# Patient Record
Sex: FEMALE | Race: WHITE | Employment: OTHER | URBAN - METROPOLITAN AREA
[De-identification: names, ages, dates, MRNs, and addresses within clinical notes are randomized per-mention and may not be internally consistent; named-entity substitution may affect disease eponyms.]

---

## 2017-02-24 PROCEDURE — 81001 URINALYSIS AUTO W/SCOPE: CPT | Performed by: INTERNAL MEDICINE

## 2017-02-24 PROCEDURE — 87086 URINE CULTURE/COLONY COUNT: CPT | Performed by: INTERNAL MEDICINE

## 2017-03-24 PROCEDURE — 81001 URINALYSIS AUTO W/SCOPE: CPT | Performed by: INTERNAL MEDICINE

## 2017-03-24 PROCEDURE — 87086 URINE CULTURE/COLONY COUNT: CPT | Performed by: INTERNAL MEDICINE

## 2017-09-07 PROBLEM — I83.10 LIPODERMATOSCLEROSIS: Status: ACTIVE | Noted: 2017-09-07

## 2017-09-07 PROBLEM — M79.3 LIPODERMATOSCLEROSIS: Status: ACTIVE | Noted: 2017-09-07

## 2018-06-13 PROBLEM — I10 ESSENTIAL HYPERTENSION, BENIGN: Status: ACTIVE | Noted: 2018-06-13

## 2018-11-03 PROCEDURE — 87086 URINE CULTURE/COLONY COUNT: CPT | Performed by: INTERNAL MEDICINE

## 2020-06-29 PROBLEM — M81.0 AGE-RELATED OSTEOPOROSIS WITHOUT CURRENT PATHOLOGICAL FRACTURE: Status: ACTIVE | Noted: 2020-06-29

## 2021-08-02 PROBLEM — E03.8 SUBCLINICAL HYPOTHYROIDISM: Status: ACTIVE | Noted: 2021-08-02

## 2022-06-03 ENCOUNTER — ANESTHESIA (OUTPATIENT)
Dept: ENDOSCOPY | Facility: HOSPITAL | Age: 82
End: 2022-06-03
Payer: MEDICARE

## 2022-06-03 ENCOUNTER — HOSPITAL ENCOUNTER (OUTPATIENT)
Facility: HOSPITAL | Age: 82
Setting detail: HOSPITAL OUTPATIENT SURGERY
Discharge: HOME | End: 2022-06-03
Attending: INTERNAL MEDICINE | Admitting: INTERNAL MEDICINE
Payer: MEDICARE

## 2022-06-03 ENCOUNTER — ANESTHESIA EVENT (OUTPATIENT)
Dept: ENDOSCOPY | Facility: HOSPITAL | Age: 82
End: 2022-06-03
Payer: MEDICARE

## 2022-06-03 VITALS
WEIGHT: 124 LBS | SYSTOLIC BLOOD PRESSURE: 123 MMHG | HEART RATE: 87 BPM | RESPIRATION RATE: 18 BRPM | BODY MASS INDEX: 22.82 KG/M2 | HEIGHT: 62 IN | OXYGEN SATURATION: 97 % | DIASTOLIC BLOOD PRESSURE: 64 MMHG | TEMPERATURE: 98 F

## 2022-06-03 PROCEDURE — 0DBK8ZX EXCISION OF ASCENDING COLON, VIA NATURAL OR ARTIFICIAL OPENING ENDOSCOPIC, DIAGNOSTIC: ICD-10-PCS | Performed by: INTERNAL MEDICINE

## 2022-06-03 PROCEDURE — 88305 TISSUE EXAM BY PATHOLOGIST: CPT | Performed by: INTERNAL MEDICINE

## 2022-06-03 RX ORDER — SODIUM CHLORIDE, SODIUM LACTATE, POTASSIUM CHLORIDE, CALCIUM CHLORIDE 600; 310; 30; 20 MG/100ML; MG/100ML; MG/100ML; MG/100ML
INJECTION, SOLUTION INTRAVENOUS CONTINUOUS
OUTPATIENT
Start: 2022-06-03

## 2022-06-03 RX ORDER — LIDOCAINE HYDROCHLORIDE 10 MG/ML
INJECTION, SOLUTION EPIDURAL; INFILTRATION; INTRACAUDAL; PERINEURAL AS NEEDED
Status: DISCONTINUED | OUTPATIENT
Start: 2022-06-03 | End: 2022-06-03 | Stop reason: SURG

## 2022-06-03 RX ORDER — SODIUM CHLORIDE, SODIUM LACTATE, POTASSIUM CHLORIDE, CALCIUM CHLORIDE 600; 310; 30; 20 MG/100ML; MG/100ML; MG/100ML; MG/100ML
INJECTION, SOLUTION INTRAVENOUS CONTINUOUS
Status: DISCONTINUED | OUTPATIENT
Start: 2022-06-03 | End: 2022-06-03

## 2022-06-03 RX ORDER — NALOXONE HYDROCHLORIDE 0.4 MG/ML
80 INJECTION, SOLUTION INTRAMUSCULAR; INTRAVENOUS; SUBCUTANEOUS AS NEEDED
OUTPATIENT
Start: 2022-06-03 | End: 2022-06-03

## 2022-06-03 RX ADMIN — LIDOCAINE HYDROCHLORIDE 50 MG: 10 INJECTION, SOLUTION EPIDURAL; INFILTRATION; INTRACAUDAL; PERINEURAL at 12:57:00

## 2022-06-03 NOTE — OPERATIVE REPORT
Colonoscopy Operative Report    Ramses Martinez Patient Status:  [facility] Outpatient Surgery    1940 MRN [id number]   Location [address] [address] Attending Benji Beckett MD   Hosp Day #   0 PCP Kaia Perez MD     Pre-Operative Diagnosis: POSITIVE COLRECTAL CNACER SCREENNG, COLOGUARD    Post-Operative Diagnosis:  Pan diverticulosis  Two 5 mm ascending colon polyps resected with cold snare    Procedure Performed: Procedure(s):  COLONOSCOPY with cold snare polypectomy x2     Informed Consent: Informed consent for both the procedure and sedation were obtained from the patient. The potentially life-threatening complications of sedation, bleeding,  perforation, transfusion or repeat endoscopy  were reviewed along with the possible need for hospitalization, surgical management, transfusion or repeat endoscopy should one of these complications arise. The patient understands and is agreeable to proceed. Sedation Type: MAC-Patient received sedation with monitored anesthesia provided by an anesthesiologist      Cecum Withdrawal Time:  12 minutes      Procedure Description: The patient was placed in the left lateral decubitus position. After careful digital rectal examination, the Pediatric colonoscope was inserted into the rectum and advanced to the level of the cecum under direct visualization. The cecum was identified by landmarks, including the appendiceal orifice and ileoceccal valve. Careful examination of the entire colon was performed during withdrawal of the endoscope. The scope was withdrawn to the rectum and retroflexion was performed. The patient tolerated the procedure well with no immediate complications. The patient was transferred to the recovery area in stable condition.   Quality of Preparation: Adequate  Aronchick Bowel Prep Scale:  good  Findings: Pan diverticulosis  Two 5 mm ascending colon polyps resected with cold snare    Recommendations: await pathology  Discharge: The patient was given an after visit summary detailing the procedure, findings, recommendations and follow up plans.      Aleshia Saab MD  6/3/2022  1:19 PM

## 2022-06-03 NOTE — ANESTHESIA POSTPROCEDURE EVALUATION
Seferino Staton 54 Patient Status:  Hospital Outpatient Surgery   Age/Gender 78year old female MRN [id number]   Location [address] [facility] Attending Torrie Benoit MD   [facility] Day # 0 PCP Robert Goldberg MD       Anesthesia Post-op Note    COLONOSCOPY with cold snare polypectomy x2    Procedure Summary     Date: 06/03/22 Room / Location: [facility] ENDOSCOPY 02 / [facility] ENDOSCOPY    Anesthesia Start: 7628 Anesthesia Stop: 5190    Procedure: COLONOSCOPY with cold snare polypectomy x2 (N/A ) Diagnosis: (Polyps, diveticulosis)    Surgeons: Torrie Benoit MD Anesthesiologist: Parul Olivas MD    Anesthesia Type: MAC ASA Status: 2          Anesthesia Type: MAC    Vitals Value Taken Time   /62 06/03/22 1328   Temp    06/03/22 1333   Pulse 80 06/03/22 1333   Resp 16 06/03/22 1333   SpO2 96 % 06/03/22 1333   Vitals shown include unvalidated device data.     Patient Location: Endoscopy    Anesthesia Type: MAC    Airway Patency: patent    Postop Pain Control: adequate    Mental Status: preanesthetic baseline    Nausea/Vomiting: none    Cardiopulmonary/Hydration status: stable euvolemic    Complications: no apparent anesthesia related complications    Postop vital signs: stable    Dental Exam: Unchanged from Preop

## 2024-05-21 ENCOUNTER — OFFICE VISIT (OUTPATIENT)
Dept: PODIATRY CLINIC | Facility: CLINIC | Age: 84
End: 2024-05-21

## 2024-05-21 DIAGNOSIS — M20.41 HAMMER TOES OF BOTH FEET: Primary | ICD-10-CM

## 2024-05-21 DIAGNOSIS — M79.675 TOE PAIN, BILATERAL: ICD-10-CM

## 2024-05-21 DIAGNOSIS — M20.42 HAMMER TOES OF BOTH FEET: Primary | ICD-10-CM

## 2024-05-21 DIAGNOSIS — M79.674 TOE PAIN, BILATERAL: ICD-10-CM

## 2024-05-21 DIAGNOSIS — L84 FOOT CALLUS: ICD-10-CM

## 2024-05-21 DIAGNOSIS — B35.1 ONYCHOMYCOSIS: ICD-10-CM

## 2024-05-21 DIAGNOSIS — M15.9 OSTEOARTHRITIS OF MULTIPLE JOINTS, UNSPECIFIED OSTEOARTHRITIS TYPE: ICD-10-CM

## 2024-05-21 PROCEDURE — 99203 OFFICE O/P NEW LOW 30 MIN: CPT | Performed by: STUDENT IN AN ORGANIZED HEALTH CARE EDUCATION/TRAINING PROGRAM

## 2024-05-24 NOTE — PROGRESS NOTES
[facility] Podiatry  Progress Note    Dee Moreno is a 84 year old female.   Chief Complaint   Patient presents with    Consult     Right foot 2nd toe wart- patient states toe is sore pain 5/10         HPI:     Patient is a pleasant 84-year-old female who presents to clinic for evaluation of lesion to her right second toe.  She does have some soreness to site that she rates at a 5 out of 10.  She denies any recent trauma or injury.  She is wondering what treatment options are available.  She does also have elongated and thickened nails she sometimes has difficulty trimming on her own.  No other complaints are mentioned.  Past medical history, medications, and allergies reviewed.      Allergies: Codeine phosphate, Azithromycin, Bactrim [sulfamethoxazole w/trimethoprim], Clindamycin, Penicillins, and Sulfa antibiotics   Current Outpatient Medications   Medication Sig Dispense Refill    traMADol 50 MG Oral Tab Take 1 tablet (50 mg total) by mouth every 6 (six) hours as needed for Pain. 270 tablet 2    levothyroxine 25 MCG Oral Tab Take 1 tablet (25 mcg total) by mouth before breakfast. 90 tablet 1    AMLODIPINE 5 MG Oral Tab TAKE 1 TABLET(5 MG) BY MOUTH DAILY 90 tablet 3    VYZULTA 0.024 % Ophthalmic Solution Place 1 drop into both eyes nightly. TAKE AT BEDTIME      famoTIDine 20 MG Oral Tab Take 1 tablet (20 mg total) by mouth nightly as needed for Heartburn. 90 tablet 3    lisinopril 40 MG Oral Tab TAKE 1 TABLET(40 MG) BY MOUTH DAILY 90 tablet 3    Meclizine HCl 25 MG Oral Tab Take 1 tablet (25 mg total) by mouth 3 (three) times daily as needed. 30 tablet 1    Denosumab 60 MG/ML Subcutaneous Solution Prefilled Syringe Inject 1 mL (60 mg total) into the skin every 6 (six) months.      Calcium Carb-Cholecalciferol (CALCIUM-VITAMIN D) 500-200 MG-UNIT Oral Tab Take 2 tablets by mouth.      hydrochlorothiazide 25 MG Oral Tab Take daily as needed 90 tablet 3    Estradiol (ESTRACE) 0.1 MG/GM Vaginal Cream Place 1 g  vaginally 3 (three) times a week. 42.5 g 11    aspirin 81 MG Oral Chew Tab Chew 1 tablet (81 mg total) by mouth daily.      vitamin E 1000 UNITS Oral Cap Take 1 capsule (1,000 Units total) by mouth 2 (two) times daily.      dorzolamide-timolol 22.3-6.8 MG/ML Ophthalmic Solution 1 drop 2 (two) times daily.      JUICE PLUS FIBRE OR Take 2 tablets by mouth 2 (two) times daily.      OMEGA 3 OR 1 TABLET DAILY      VITAMIN D 1000 UNIT OR TABS Take 2 tablets by mouth 2 (two) times daily.      OSCAL 500/200 D-3 500-200 MG-UNIT OR TABS Take 2 tablets by mouth 2 (two) times daily.      TYLENOL ARTHRITIS PAIN 650 MG OR TBCR Take 2 tablets (1,300 mg total) by mouth 2 (two) times daily.        Past Medical History:    Cataract    Disorder of thyroid    Esophageal reflux    Glaucoma    High blood pressure    High cholesterol    OSTEOARTHRITIS    Osteoarthritis of multiple joints, unspecified osteoarthritis type    OSTEOPOROSIS    PONV (postoperative nausea and vomiting)    ponv; bowel went into spasm per patient, use smaller scope      Past Surgical History:   Procedure Laterality Date    Appendectomy      Cataract      Colonoscopy N/A 6/3/2022    Procedure: COLONOSCOPY with cold snare polypectomy x2;  Surgeon: Rob Portillo MD;  Location:  ENDOSCOPY    Hysterectomy  8/10/16    vaginal hysterectomy and bladder sling with BSO    Mammogram, diagnostic, unilateral, right (cpt=77055)  9/09    [facility]    Other surgical history      multiple surgeries from fractures from falls    Other surgical history      bladder sling      Family History   Problem Relation Age of Onset    Breast Cancer Maternal Aunt 50        age 50's    Breast Cancer Sister 75        age 70's    Other (brain aneurysm) Father       Social History     Socioeconomic History    Marital status: Single   Occupational History    Occupation: Nun; [email or web address]   Tobacco Use    Smoking status: Never    Smokeless tobacco: Never   Vaping Use    Vaping status:  Never Used   Substance and Sexual Activity    Alcohol use: No     Alcohol/week: 0.0 standard drinks of alcohol    Drug use: No    Sexual activity: Never   Other Topics Concern    Exercise Yes     Comment: walk           REVIEW OF SYSTEMS:     Today reviewed systems as documented below  GENERAL HEALTH: feels well otherwise  SKIN: denies any unusual skin lesions or rashes  RESPIRATORY: denies shortness of breath with exertion  CARDIOVASCULAR: denies chest pain on exertion  GI: denies abdominal pain and denies heartburn  NEURO: denies headaches  MUSCULO: History of arthritis      EXAM:   LMP  (LMP Unknown)   GENERAL: well developed, well nourished, in no apparent distress  EXTREMITIES:   1. Integument: Normal skin temperature and turgor.  HPK noted to distal aspect of right second toe.  Nails x 10 are elongated and thickened.  2. Vascular: Pedal pulses are palpable.   3. Musculoskeletal: All muscle groups are graded 5 out of 5 in the foot and ankle.  Flexion contracture of lesser digits.  Pain noted to callus site.   4. Neurological: Normal sharp dull sensation; reflexes normal.        ASSESSMENT AND PLAN:   Diagnoses and all orders for this visit:    Hammer toes of both feet    Foot callus    Osteoarthritis of multiple joints, unspecified osteoarthritis type    Onychomycosis    Toe pain, bilateral        Plan:    -Patient examined, chart history reviewed.  -Discussed importance of proper pedal hygiene, regular foot checks.  -Pared HPK x 1 to healthy tissue without incident.  Can use urea cream to callus sites between visits.  -Sharply debrided nails x10 with a sterile nail nipper achieving a 20% reduction in thickness and length, without incident. Nails further smoothed with dremel.  -Ambulate with supportive shoes and inserts and avoid walking barefoot.  -Educated patient on acute signs of infection advised patient to seek immediate medical attention if symptoms arise.     RTC 2 to 3 months routine footcare as  needed.    RAQUEL Merlos speech recognition software was used to prepare this note.  Errors in word recognition may occur.  Please contact me with any questions/concerns with this note.

## 2024-07-02 ENCOUNTER — OFFICE VISIT (OUTPATIENT)
Dept: PODIATRY CLINIC | Facility: CLINIC | Age: 84
End: 2024-07-02

## 2024-07-02 DIAGNOSIS — L84 HELOMA DURUM: ICD-10-CM

## 2024-07-02 DIAGNOSIS — M20.42 HAMMER TOES OF BOTH FEET: Primary | ICD-10-CM

## 2024-07-02 DIAGNOSIS — M20.41 HAMMER TOES OF BOTH FEET: Primary | ICD-10-CM

## 2024-07-02 DIAGNOSIS — M20.41 HAMMER TOE OF RIGHT FOOT: ICD-10-CM

## 2024-07-02 PROCEDURE — 99213 OFFICE O/P EST LOW 20 MIN: CPT | Performed by: PODIATRIST

## 2024-07-02 NOTE — PROGRESS NOTES
Dee Moreno is a 84 year old female.   Chief Complaint   Patient presents with    Corn     Right 2nd toe- dr block shaved the corn down- but it keeps coming back- patient states that she tried to remove it- rates pain 8/10- burning pain goes underneath the right 2nd toe.          HPI:   Returns to the clinic complaining of her fourth toe on the right foot she develops a lot of pain when she tries to walk on it is at the very distal tip and pain comes down the side.  Pain can be as bad as 8 out of 10 burning pain.  At today's visit reviewed nurse's history as taken above, allergies medications and medical history as documented below.  All changes duly noted  Allergies: Codeine phosphate, Azithromycin, Bactrim [sulfamethoxazole w/trimethoprim], Clindamycin, Penicillins, and Sulfa antibiotics   Current Outpatient Medications   Medication Sig Dispense Refill    traMADol 50 MG Oral Tab Take 1 tablet (50 mg total) by mouth every 6 (six) hours as needed for Pain. 270 tablet 2    levothyroxine 25 MCG Oral Tab Take 1 tablet (25 mcg total) by mouth before breakfast. 90 tablet 1    AMLODIPINE 5 MG Oral Tab TAKE 1 TABLET(5 MG) BY MOUTH DAILY 90 tablet 3    VYZULTA 0.024 % Ophthalmic Solution Place 1 drop into both eyes nightly. TAKE AT BEDTIME      famoTIDine 20 MG Oral Tab Take 1 tablet (20 mg total) by mouth nightly as needed for Heartburn. 90 tablet 3    lisinopril 40 MG Oral Tab TAKE 1 TABLET(40 MG) BY MOUTH DAILY 90 tablet 3    Meclizine HCl 25 MG Oral Tab Take 1 tablet (25 mg total) by mouth 3 (three) times daily as needed. 30 tablet 1    Denosumab 60 MG/ML Subcutaneous Solution Prefilled Syringe Inject 1 mL (60 mg total) into the skin every 6 (six) months.      Calcium Carb-Cholecalciferol (CALCIUM-VITAMIN D) 500-200 MG-UNIT Oral Tab Take 2 tablets by mouth.      hydrochlorothiazide 25 MG Oral Tab Take daily as needed 90 tablet 3    Estradiol (ESTRACE) 0.1 MG/GM Vaginal Cream Place 1 g vaginally 3 (three) times a  week. 42.5 g 11    aspirin 81 MG Oral Chew Tab Chew 1 tablet (81 mg total) by mouth daily.      vitamin E 1000 UNITS Oral Cap Take 1 capsule (1,000 Units total) by mouth 2 (two) times daily.      dorzolamide-timolol 22.3-6.8 MG/ML Ophthalmic Solution 1 drop 2 (two) times daily.      JUICE PLUS FIBRE OR Take 2 tablets by mouth 2 (two) times daily.      OMEGA 3 OR 1 TABLET DAILY      VITAMIN D 1000 UNIT OR TABS Take 2 tablets by mouth 2 (two) times daily.      OSCAL 500/200 D-3 500-200 MG-UNIT OR TABS Take 2 tablets by mouth 2 (two) times daily.      TYLENOL ARTHRITIS PAIN 650 MG OR TBCR Take 2 tablets (1,300 mg total) by mouth 2 (two) times daily.        Past Medical History:    Cataract    Disorder of thyroid    Esophageal reflux    Glaucoma    High blood pressure    High cholesterol    OSTEOARTHRITIS    Osteoarthritis of multiple joints, unspecified osteoarthritis type    OSTEOPOROSIS    PONV (postoperative nausea and vomiting)    ponv; bowel went into spasm per patient, use smaller scope      Past Surgical History:   Procedure Laterality Date    Appendectomy      Cataract      Colonoscopy N/A 6/3/2022    Procedure: COLONOSCOPY with cold snare polypectomy x2;  Surgeon: Rob Portillo MD;  Location:  ENDOSCOPY    Hysterectomy  8/10/16    vaginal hysterectomy and bladder sling with BSO    Mammogram, diagnostic, unilateral, right (cpt=77055)  9/09    [facility]    Other surgical history      multiple surgeries from fractures from falls    Other surgical history      bladder sling      Family History   Problem Relation Age of Onset    Breast Cancer Maternal Aunt 50        age 50's    Breast Cancer Sister 75        age 70's    Other (brain aneurysm) Father       Social History     Socioeconomic History    Marital status: Single   Occupational History    Occupation: Nun; [employer] education   Tobacco Use    Smoking status: Never    Smokeless tobacco: Never   Vaping Use    Vaping status: Never Used   Substance and  Sexual Activity    Alcohol use: No     Alcohol/week: 0.0 standard drinks of alcohol    Drug use: No    Sexual activity: Never   Other Topics Concern    Exercise Yes     Comment: walk           REVIEW OF SYSTEMS:   Today reviewed systens as documented below  GENERAL HEALTH: feels well otherwise  SKIN: Refer to exam below  RESPIRATORY: denies shortness of breath with exertion  CARDIOVASCULAR: denies chest pain on exertion  GI: denies abdominal pain and denies heartburn  NEURO: denies headaches    EXAM:   LMP  (LMP Unknown)   GENERAL: well developed, well nourished, in no apparent distress  EXTREMITIES:   1. Integument: The skin on the right foot was evaluated its warm and dry.  She has a very painful nucleated keratoma to the distal tip of the fourth toe on the right foot.   2. Vascular: Patient has weakly palpable pulses   3. Neurologic: Patient has intact sensorium   4. Musculoskeletal: Patient has a rigid nonreducible fourth hammertoe with a distal tip keratoma that is extremely painful to walk on.    ASSESSMENT AND PLAN:   Diagnoses and all orders for this visit:    Hammer toes of both feet    Heloma durum    Hammer toe of right foot        Plan: Today trimmed down and debrided the lesion we will see her again in follow-up gave her a crest pad to help keep pressure off of that she will not wear it to bed because she was told it could cause circulation off on the toe that it is attached to.  She understood all instructions we will follow-up as needed.    The patient indicates understanding of these issues and agrees to the plan.    John Harmon DPM

## 2024-09-09 ENCOUNTER — OFFICE VISIT (OUTPATIENT)
Dept: PODIATRY CLINIC | Facility: CLINIC | Age: 84
End: 2024-09-09

## 2024-09-09 DIAGNOSIS — M79.674 TOE PAIN, BILATERAL: ICD-10-CM

## 2024-09-09 DIAGNOSIS — L84 HELOMA DURUM: ICD-10-CM

## 2024-09-09 DIAGNOSIS — M20.42 HAMMER TOES OF BOTH FEET: Primary | ICD-10-CM

## 2024-09-09 DIAGNOSIS — M20.41 HAMMER TOES OF BOTH FEET: Primary | ICD-10-CM

## 2024-09-09 DIAGNOSIS — B35.1 ONYCHOMYCOSIS: ICD-10-CM

## 2024-09-09 DIAGNOSIS — M79.675 TOE PAIN, BILATERAL: ICD-10-CM

## 2024-09-09 DIAGNOSIS — M20.41 HAMMER TOE OF RIGHT FOOT: ICD-10-CM

## 2024-09-09 PROCEDURE — 99213 OFFICE O/P EST LOW 20 MIN: CPT | Performed by: PODIATRIST

## 2024-09-09 NOTE — PROGRESS NOTES
Dee Moreno is a 84 year old female.   Chief Complaint   Patient presents with    Toenail Care     Nail trim and foot check f/u- also here for R 4th  toe lesion f/u- denies pain          HPI:   Returns to the clinic complaining of a painful corn on the fourth toe.  At today's visit reviewed nurse's history as taken above, allergies medications and medical history as documented below.  All changes duly noted  Allergies: Codeine phosphate, Azithromycin, Bactrim [sulfamethoxazole w/trimethoprim], Clindamycin, Penicillins, and Sulfa antibiotics   Current Outpatient Medications   Medication Sig Dispense Refill    traMADol 50 MG Oral Tab Take 1 tablet (50 mg total) by mouth every 6 (six) hours as needed for Pain. 270 tablet 2    levothyroxine 25 MCG Oral Tab Take 1 tablet (25 mcg total) by mouth before breakfast. 90 tablet 1    AMLODIPINE 5 MG Oral Tab TAKE 1 TABLET(5 MG) BY MOUTH DAILY 90 tablet 3    VYZULTA 0.024 % Ophthalmic Solution Place 1 drop into both eyes nightly. TAKE AT BEDTIME      famoTIDine 20 MG Oral Tab Take 1 tablet (20 mg total) by mouth nightly as needed for Heartburn. 90 tablet 3    lisinopril 40 MG Oral Tab TAKE 1 TABLET(40 MG) BY MOUTH DAILY 90 tablet 3    Meclizine HCl 25 MG Oral Tab Take 1 tablet (25 mg total) by mouth 3 (three) times daily as needed. 30 tablet 1    Denosumab 60 MG/ML Subcutaneous Solution Prefilled Syringe Inject 1 mL (60 mg total) into the skin every 6 (six) months.      Calcium Carb-Cholecalciferol (CALCIUM-VITAMIN D) 500-200 MG-UNIT Oral Tab Take 2 tablets by mouth.      hydrochlorothiazide 25 MG Oral Tab Take daily as needed 90 tablet 3    Estradiol (ESTRACE) 0.1 MG/GM Vaginal Cream Place 1 g vaginally 3 (three) times a week. 42.5 g 11    aspirin 81 MG Oral Chew Tab Chew 1 tablet (81 mg total) by mouth daily.      vitamin E 1000 UNITS Oral Cap Take 1 capsule (1,000 Units total) by mouth 2 (two) times daily.      dorzolamide-timolol 22.3-6.8 MG/ML Ophthalmic Solution 1 drop  2 (two) times daily.      JUICE PLUS FIBRE OR Take 2 tablets by mouth 2 (two) times daily.      OMEGA 3 OR 1 TABLET DAILY      VITAMIN D 1000 UNIT OR TABS Take 2 tablets by mouth 2 (two) times daily.      OSCAL 500/200 D-3 500-200 MG-UNIT OR TABS Take 2 tablets by mouth 2 (two) times daily.      TYLENOL ARTHRITIS PAIN 650 MG OR TBCR Take 2 tablets (1,300 mg total) by mouth 2 (two) times daily.        Past Medical History:    Cataract    Disorder of thyroid    Esophageal reflux    Glaucoma    High blood pressure    High cholesterol    OSTEOARTHRITIS    Osteoarthritis of multiple joints, unspecified osteoarthritis type    OSTEOPOROSIS    PONV (postoperative nausea and vomiting)    ponv; bowel went into spasm per patient, use smaller scope      Past Surgical History:   Procedure Laterality Date    Appendectomy      Cataract      Colonoscopy N/A 6/3/2022    Procedure: COLONOSCOPY with cold snare polypectomy x2;  Surgeon: Rob Portillo MD;  Location:  ENDOSCOPY    Hysterectomy  8/10/16    vaginal hysterectomy and bladder sling with BSO    Mammogram, diagnostic, unilateral, right (cpt=77055)  9/09    [facility]    Other surgical history      multiple surgeries from fractures from falls    Other surgical history      bladder sling      Family History   Problem Relation Age of Onset    Breast Cancer Maternal Aunt 50        age 50's    Breast Cancer Sister 75        age 70's    Other (brain aneurysm) Father       Social History     Socioeconomic History    Marital status: Single   Occupational History    Occupation: Nun; [employer]   Tobacco Use    Smoking status: Never    Smokeless tobacco: Never   Vaping Use    Vaping status: Never Used   Substance and Sexual Activity    Alcohol use: No     Alcohol/week: 0.0 standard drinks of alcohol    Drug use: No    Sexual activity: Never   Other Topics Concern    Exercise Yes     Comment: walk           REVIEW OF SYSTEMS:   Today reviewed systens as documented  below  GENERAL HEALTH: feels well otherwise  SKIN: Refer to exam below  RESPIRATORY: denies shortness of breath with exertion  CARDIOVASCULAR: denies chest pain on exertion  GI: denies abdominal pain and denies heartburn  NEURO: denies headaches    EXAM:   LMP  (LMP Unknown)   GENERAL: well developed, well nourished, in no apparent distress  EXTREMITIES:   1. Integument: The skin on her right foot was evaluated its warm and dry she has a corn on the fourth toe which has a small dot in it.  Does not appear to be a wart.   2. Vascular: Patient has palpable pulse   3. Neurologic: Has intact sense   4. Musculoskeletal: Patient has hammertoes fourth and fifth digits on the left foot.  With hyperkeratosis at the distal tip.    ASSESSMENT AND PLAN:   Diagnoses and all orders for this visit:    Hammer toes of both feet    Heloma durum    Hammer toe of right foot    Onychomycosis    Toe pain, bilateral        Plan: Today trimmed down and debrided the hyperkeratosis on trimming it bled quite readily on the fourth toe distal tip attempted Lumicain and silver nitrate but there was still some mild bleeding dressed with antibiotic ointment Band-Aid and a Coban wrap which she will leave in place until tomorrow she will see us tomorrow for removal.  If it does get removed and bleed she can put a Band-Aid on it there will be some blackened discoloration because of the silver nitrate.    The patient indicates understanding of these issues and agrees to the plan.    John Harmon DPM

## 2024-09-10 ENCOUNTER — OFFICE VISIT (OUTPATIENT)
Dept: PODIATRY CLINIC | Facility: CLINIC | Age: 84
End: 2024-09-10

## 2024-09-10 DIAGNOSIS — M20.41 HAMMER TOES OF BOTH FEET: Primary | ICD-10-CM

## 2024-09-10 DIAGNOSIS — L84 HELOMA DURUM: ICD-10-CM

## 2024-09-10 DIAGNOSIS — M20.42 HAMMER TOES OF BOTH FEET: Primary | ICD-10-CM

## 2024-09-10 DIAGNOSIS — M20.41 HAMMER TOE OF RIGHT FOOT: ICD-10-CM

## 2024-09-10 PROCEDURE — 99213 OFFICE O/P EST LOW 20 MIN: CPT | Performed by: PODIATRIST

## 2024-09-10 NOTE — PROGRESS NOTES
Dee Moreno is a 84 year old female.   Chief Complaint   Patient presents with    Follow - Up     Right 4th toe- patient denies pain- burning sensation- the bandage bothers patient last night          HPI:   Patient returns to the clinic as scheduled for checkup on her fourth toe which bled yesterday.  She was having some stinging pain in it.  At today's visit reviewed nurse's history as taken above, allergies medications and medical history as documented below.  All changes duly noted  Allergies: Codeine phosphate, Azithromycin, Bactrim [sulfamethoxazole w/trimethoprim], Clindamycin, Penicillins, and Sulfa antibiotics   Current Outpatient Medications   Medication Sig Dispense Refill    traMADol 50 MG Oral Tab Take 1 tablet (50 mg total) by mouth every 6 (six) hours as needed for Pain. 270 tablet 2    levothyroxine 25 MCG Oral Tab Take 1 tablet (25 mcg total) by mouth before breakfast. 90 tablet 1    AMLODIPINE 5 MG Oral Tab TAKE 1 TABLET(5 MG) BY MOUTH DAILY 90 tablet 3    VYZULTA 0.024 % Ophthalmic Solution Place 1 drop into both eyes nightly. TAKE AT BEDTIME      famoTIDine 20 MG Oral Tab Take 1 tablet (20 mg total) by mouth nightly as needed for Heartburn. 90 tablet 3    lisinopril 40 MG Oral Tab TAKE 1 TABLET(40 MG) BY MOUTH DAILY 90 tablet 3    Meclizine HCl 25 MG Oral Tab Take 1 tablet (25 mg total) by mouth 3 (three) times daily as needed. 30 tablet 1    Denosumab 60 MG/ML Subcutaneous Solution Prefilled Syringe Inject 1 mL (60 mg total) into the skin every 6 (six) months.      Calcium Carb-Cholecalciferol (CALCIUM-VITAMIN D) 500-200 MG-UNIT Oral Tab Take 2 tablets by mouth.      hydrochlorothiazide 25 MG Oral Tab Take daily as needed 90 tablet 3    Estradiol (ESTRACE) 0.1 MG/GM Vaginal Cream Place 1 g vaginally 3 (three) times a week. 42.5 g 11    aspirin 81 MG Oral Chew Tab Chew 1 tablet (81 mg total) by mouth daily.      vitamin E 1000 UNITS Oral Cap Take 1 capsule (1,000 Units total) by mouth 2 (two)  times daily.      dorzolamide-timolol 22.3-6.8 MG/ML Ophthalmic Solution 1 drop 2 (two) times daily.      JUICE PLUS FIBRE OR Take 2 tablets by mouth 2 (two) times daily.      OMEGA 3 OR 1 TABLET DAILY      VITAMIN D 1000 UNIT OR TABS Take 2 tablets by mouth 2 (two) times daily.      OSCAL 500/200 D-3 500-200 MG-UNIT OR TABS Take 2 tablets by mouth 2 (two) times daily.      TYLENOL ARTHRITIS PAIN 650 MG OR TBCR Take 2 tablets (1,300 mg total) by mouth 2 (two) times daily.        Past Medical History:    Cataract    Disorder of thyroid    Esophageal reflux    Glaucoma    High blood pressure    High cholesterol    OSTEOARTHRITIS    Osteoarthritis of multiple joints, unspecified osteoarthritis type    OSTEOPOROSIS    PONV (postoperative nausea and vomiting)    ponv; bowel went into spasm per patient, use smaller scope      Past Surgical History:   Procedure Laterality Date    Appendectomy      Cataract      Colonoscopy N/A 6/3/2022    Procedure: COLONOSCOPY with cold snare polypectomy x2;  Surgeon: Rob Portillo MD;  Location:  ENDOSCOPY    Hysterectomy  8/10/16    vaginal hysterectomy and bladder sling with BSO    Mammogram, diagnostic, unilateral, right (cpt=77055)  9/09    [facility]    Other surgical history      multiple surgeries from fractures from falls    Other surgical history      bladder sling      Family History   Problem Relation Age of Onset    Breast Cancer Maternal Aunt 50        age 50's    Breast Cancer Sister 75        age 70's    Other (brain aneurysm) Father       Social History     Socioeconomic History    Marital status: Single   Occupational History    Occupation: Nun; [employer]   Tobacco Use    Smoking status: Never    Smokeless tobacco: Never   Vaping Use    Vaping status: Never Used   Substance and Sexual Activity    Alcohol use: No     Alcohol/week: 0.0 standard drinks of alcohol    Drug use: No    Sexual activity: Never   Other Topics Concern    Exercise Yes     Comment:  walk           REVIEW OF SYSTEMS:   Today reviewed systens as documented below  GENERAL HEALTH: feels well otherwise  SKIN: Refer to exam below  RESPIRATORY: denies shortness of breath with exertion  CARDIOVASCULAR: denies chest pain on exertion  GI: denies abdominal pain and denies heartburn  NEURO: denies headaches    EXAM:   LMP  (LMP Unknown)   GENERAL: well developed, well nourished, in no apparent distress  EXTREMITIES:   1. Integument: The skin on the tip of the toe just shows some discoloration from the silver nitrate is not gangrene.  There is no more hemorrhaging.  Antibiotic ointment and a Band-Aid was applied there is no evidence of cellulitis of the toe fourth toe, right foot   2. Vascular: Patient has palpable pulses   3. Neurologic: Has intact sensorium   4. Musculoskeletal: Patient has hammertoes fourth and fifth toes on the right foot.    ASSESSMENT AND PLAN:   Diagnoses and all orders for this visit:    Hammer toes of both feet    Heloma durum    Hammer toe of right foot        Plan: Today just put a Band-Aid and antibiotic ointment on it she will apply a Band-Aid and antibiotic for the next few days should be fine we will see her again in a month maybe 6 weeks trim her toenails corns calluses.  Follow-up at that time call us if there is any problems.  Does not require oral antibiotics at this time.    The patient indicates understanding of these issues and agrees to the plan.    John Harmon DPM

## 2024-11-12 ENCOUNTER — OFFICE VISIT (OUTPATIENT)
Dept: PODIATRY CLINIC | Facility: CLINIC | Age: 84
End: 2024-11-12

## 2024-11-12 DIAGNOSIS — L84 FOOT CALLUS: ICD-10-CM

## 2024-11-12 DIAGNOSIS — L84 HELOMA DURUM: ICD-10-CM

## 2024-11-12 DIAGNOSIS — B35.1 ONYCHOMYCOSIS: ICD-10-CM

## 2024-11-12 DIAGNOSIS — M20.41 HAMMER TOES OF BOTH FEET: Primary | ICD-10-CM

## 2024-11-12 DIAGNOSIS — M20.41 HAMMER TOE OF RIGHT FOOT: ICD-10-CM

## 2024-11-12 DIAGNOSIS — M20.42 HAMMER TOES OF BOTH FEET: Primary | ICD-10-CM

## 2024-11-12 PROCEDURE — 99213 OFFICE O/P EST LOW 20 MIN: CPT | Performed by: PODIATRIST

## 2024-11-18 NOTE — PROGRESS NOTES
Dee Moreno is a 84 year old female.   Chief Complaint   Patient presents with    Toenail Care     Nail trim and foot check f/u-  stated also here for corn in the toe check- denies pain          HPI:   Patient returns to the clinic for a checkup on her toes again complaining of painful corns and heel toenails.  At today's visit reviewed nurse's history as taken above, allergies medications and medical history as documented below.  All changes duly noted  Allergies: Codeine phosphate, Azithromycin, Bactrim [sulfamethoxazole w/trimethoprim], Clindamycin, Penicillins, and Sulfa antibiotics   Current Outpatient Medications   Medication Sig Dispense Refill    traMADol 50 MG Oral Tab Take 1 tablet (50 mg total) by mouth every 6 (six) hours as needed for Pain. 270 tablet 2    levothyroxine 25 MCG Oral Tab Take 1 tablet (25 mcg total) by mouth before breakfast. 90 tablet 1    AMLODIPINE 5 MG Oral Tab TAKE 1 TABLET(5 MG) BY MOUTH DAILY 90 tablet 3    VYZULTA 0.024 % Ophthalmic Solution Place 1 drop into both eyes nightly. TAKE AT BEDTIME      famoTIDine 20 MG Oral Tab Take 1 tablet (20 mg total) by mouth nightly as needed for Heartburn. 90 tablet 3    lisinopril 40 MG Oral Tab TAKE 1 TABLET(40 MG) BY MOUTH DAILY 90 tablet 3    Meclizine HCl 25 MG Oral Tab Take 1 tablet (25 mg total) by mouth 3 (three) times daily as needed. 30 tablet 1    Denosumab 60 MG/ML Subcutaneous Solution Prefilled Syringe Inject 1 mL (60 mg total) into the skin every 6 (six) months.      Calcium Carb-Cholecalciferol (CALCIUM-VITAMIN D) 500-200 MG-UNIT Oral Tab Take 2 tablets by mouth.      hydrochlorothiazide 25 MG Oral Tab Take daily as needed 90 tablet 3    Estradiol (ESTRACE) 0.1 MG/GM Vaginal Cream Place 1 g vaginally 3 (three) times a week. 42.5 g 11    aspirin 81 MG Oral Chew Tab Chew 1 tablet (81 mg total) by mouth daily.      vitamin E 1000 UNITS Oral Cap Take 1 capsule (1,000 Units total) by mouth 2 (two) times daily.       dorzolamide-timolol 22.3-6.8 MG/ML Ophthalmic Solution 1 drop 2 (two) times daily.      JUICE PLUS FIBRE OR Take 2 tablets by mouth 2 (two) times daily.      OMEGA 3 OR 1 TABLET DAILY      VITAMIN D 1000 UNIT OR TABS Take 2 tablets by mouth 2 (two) times daily.      OSCAL 500/200 D-3 500-200 MG-UNIT OR TABS Take 2 tablets by mouth 2 (two) times daily.      TYLENOL ARTHRITIS PAIN 650 MG OR TBCR Take 2 tablets (1,300 mg total) by mouth 2 (two) times daily.        Past Medical History:    Cataract    Disorder of thyroid    Esophageal reflux    Glaucoma    High blood pressure    High cholesterol    OSTEOARTHRITIS    Osteoarthritis of multiple joints, unspecified osteoarthritis type    OSTEOPOROSIS    PONV (postoperative nausea and vomiting)    ponv; bowel went into spasm per patient, use smaller scope      Past Surgical History:   Procedure Laterality Date    Appendectomy      Cataract      Colonoscopy N/A 6/3/2022    Procedure: COLONOSCOPY with cold snare polypectomy x2;  Surgeon: Rob Portillo MD;  Location:  ENDOSCOPY    Hysterectomy  8/10/16    vaginal hysterectomy and bladder sling with BSO    Mammogram, diagnostic, unilateral, right (cpt=77055)  9/09    [facility]    Other surgical history      multiple surgeries from fractures from falls    Other surgical history      bladder sling      Family History   Problem Relation Age of Onset    Breast Cancer Maternal Aunt 50        age 50's    Breast Cancer Sister 75        age 70's    Other (brain aneurysm) Father       Social History     Socioeconomic History    Marital status: Single   Occupational History    Occupation: Nun; [employer]   Tobacco Use    Smoking status: Never    Smokeless tobacco: Never   Vaping Use    Vaping status: Never Used   Substance and Sexual Activity    Alcohol use: No     Alcohol/week: 0.0 standard drinks of alcohol    Drug use: No    Sexual activity: Never   Other Topics Concern    Exercise Yes     Comment: walk            REVIEW OF SYSTEMS:   Today reviewed systens as documented below  GENERAL HEALTH: feels well otherwise  SKIN: Refer to exam below  RESPIRATORY: denies shortness of breath with exertion  CARDIOVASCULAR: denies chest pain on exertion  GI: denies abdominal pain and denies heartburn  NEURO: denies headaches    EXAM:   LMP  (LMP Unknown)   GENERAL: well developed, well nourished, in no apparent distress  EXTREMITIES:   1. Integument: The skin on the right foot was evaluated its warm and dry.  She has a very painful nucleated keratoma to the distal tip of the fourth toe on the right foot.   2. Vascular: Patient has weakly palpable pulses   3. Neurologic: Patient has intact sensorium   4. Musculoskeletal: Patient has a rigid nonreducible fourth hammertoe with a distal tip keratoma that is extremely painful to walk on.    ASSESSMENT AND PLAN:   Diagnoses and all orders for this visit:    Hammer toes of both feet    Heloma durum    Hammer toe of right foot    Onychomycosis    Foot callus        Plan: Today using a nail nippers were trimmed and debrided toenails 1-5 manually and mechanically in girth and width as far down to healthy tissue as possible on both feet.  This was done uneventfully there was no hemorrhage.  There is mild incurvation of the medial and lateral nail borders of the hallux which using a slant back technique were removed and they would not get ingrown.   Today trimmed down and debrided the lesion we will see her again in follow-up gave her a crest pad to help keep pressure off of that she will not wear it to bed because she was told it could cause circulation off on the toe that it is attached to.  She understood all instructions we will follow-up as needed.    The patient indicates understanding of these issues and agrees to the plan.    John Harmon DPM

## 2025-02-04 ENCOUNTER — OFFICE VISIT (OUTPATIENT)
Dept: PODIATRY CLINIC | Facility: CLINIC | Age: 85
End: 2025-02-04

## 2025-02-04 DIAGNOSIS — L84 HELOMA DURUM: ICD-10-CM

## 2025-02-04 DIAGNOSIS — M79.674 PAIN IN TOES OF BOTH FEET: ICD-10-CM

## 2025-02-04 DIAGNOSIS — M79.675 PAIN IN TOES OF BOTH FEET: ICD-10-CM

## 2025-02-04 DIAGNOSIS — M20.41 HAMMER TOE OF RIGHT FOOT: ICD-10-CM

## 2025-02-04 DIAGNOSIS — B35.1 ONYCHOMYCOSIS: ICD-10-CM

## 2025-02-04 DIAGNOSIS — M20.41 HAMMER TOES OF BOTH FEET: Primary | ICD-10-CM

## 2025-02-04 DIAGNOSIS — M20.42 HAMMER TOES OF BOTH FEET: Primary | ICD-10-CM

## 2025-02-04 PROCEDURE — 99213 OFFICE O/P EST LOW 20 MIN: CPT | Performed by: PODIATRIST

## 2025-02-04 NOTE — PROGRESS NOTES
Dee Moreno is a 84 year old female.   Chief Complaint   Patient presents with    Toenail Care     Nail care and foot check- corns to multiple toes         HPI:   Patient returns to the clinic complaining of painful corns tip of the fourth toe on her right foot tip of the second toe on the left foot several of her nails may need trimming she did trim them recently herself some that were longer.  She cannot provide self-care and the cords on her toes particularly on the tip of the second toe left foot and the tip of the fourth toe on the right can have a tendency to become irritated and prevent unrestricted ambulation.  At today's visit reviewed nurse's history as taken above, allergies medications and medical history as documented below.  All changes duly noted  Allergies: Codeine phosphate, Azithromycin, Bactrim [sulfamethoxazole w/trimethoprim], Clindamycin, Penicillins, and Sulfa antibiotics   Current Outpatient Medications   Medication Sig Dispense Refill    traMADol 50 MG Oral Tab Take 1 tablet (50 mg total) by mouth every 6 (six) hours as needed for Pain. 270 tablet 2    levothyroxine 25 MCG Oral Tab Take 1 tablet (25 mcg total) by mouth before breakfast. 90 tablet 1    AMLODIPINE 5 MG Oral Tab TAKE 1 TABLET(5 MG) BY MOUTH DAILY 90 tablet 3    VYZULTA 0.024 % Ophthalmic Solution Place 1 drop into both eyes nightly. TAKE AT BEDTIME      famoTIDine 20 MG Oral Tab Take 1 tablet (20 mg total) by mouth nightly as needed for Heartburn. 90 tablet 3    lisinopril 40 MG Oral Tab TAKE 1 TABLET(40 MG) BY MOUTH DAILY 90 tablet 3    Meclizine HCl 25 MG Oral Tab Take 1 tablet (25 mg total) by mouth 3 (three) times daily as needed. 30 tablet 1    Denosumab 60 MG/ML Subcutaneous Solution Prefilled Syringe Inject 1 mL (60 mg total) into the skin every 6 (six) months.      Calcium Carb-Cholecalciferol (CALCIUM-VITAMIN D) 500-200 MG-UNIT Oral Tab Take 2 tablets by mouth.      hydrochlorothiazide 25 MG Oral Tab Take daily as  needed 90 tablet 3    Estradiol (ESTRACE) 0.1 MG/GM Vaginal Cream Place 1 g vaginally 3 (three) times a week. 42.5 g 11    aspirin 81 MG Oral Chew Tab Chew 1 tablet (81 mg total) by mouth daily.      vitamin E 1000 UNITS Oral Cap Take 1 capsule (1,000 Units total) by mouth 2 (two) times daily.      dorzolamide-timolol 22.3-6.8 MG/ML Ophthalmic Solution 1 drop 2 (two) times daily.      JUICE PLUS FIBRE OR Take 2 tablets by mouth 2 (two) times daily.      OMEGA 3 OR 1 TABLET DAILY      VITAMIN D 1000 UNIT OR TABS Take 2 tablets by mouth 2 (two) times daily.      OSCAL 500/200 D-3 500-200 MG-UNIT OR TABS Take 2 tablets by mouth 2 (two) times daily.      TYLENOL ARTHRITIS PAIN 650 MG OR TBCR Take 2 tablets (1,300 mg total) by mouth 2 (two) times daily.        Past Medical History:    Cataract    Disorder of thyroid    Esophageal reflux    Glaucoma    High blood pressure    High cholesterol    OSTEOARTHRITIS    Osteoarthritis of multiple joints, unspecified osteoarthritis type    OSTEOPOROSIS    PONV (postoperative nausea and vomiting)    ponv; bowel went into spasm per patient, use smaller scope      Past Surgical History:   Procedure Laterality Date    Appendectomy      Cataract      Colonoscopy N/A 6/3/2022    Procedure: COLONOSCOPY with cold snare polypectomy x2;  Surgeon: Rob Portillo MD;  Location:  ENDOSCOPY    Hysterectomy  8/10/16    vaginal hysterectomy and bladder sling with BSO    Mammogram, diagnostic, unilateral, right (cpt=77055)  9/09    [facility]    Other surgical history      multiple surgeries from fractures from falls    Other surgical history      bladder sling      Family History   Problem Relation Age of Onset    Breast Cancer Maternal Aunt 50        age 50's    Breast Cancer Sister 75        age 70's    Other (brain aneurysm) Father       Social History     Socioeconomic History    Marital status: Single   Occupational History    Occupation: Nun; [employer]   Tobacco Use     Smoking status: Never    Smokeless tobacco: Never   Vaping Use    Vaping status: Never Used   Substance and Sexual Activity    Alcohol use: No     Alcohol/week: 0.0 standard drinks of alcohol    Drug use: No    Sexual activity: Never   Other Topics Concern    Exercise Yes     Comment: walk           REVIEW OF SYSTEMS:   Today reviewed systens as documented below  GENERAL HEALTH: feels well otherwise  SKIN: Refer to exam below  RESPIRATORY: denies shortness of breath with exertion  CARDIOVASCULAR: denies chest pain on exertion  GI: denies abdominal pain and denies heartburn  NEURO: denies headaches    EXAM:   LMP  (LMP Unknown)   GENERAL: well developed, well nourished, in no apparent distress  EXTREMITIES:   1. Integument: Skin on her feet is cool and dry his skin is somewhat thin and atrophic she has a painful nucleated keratoma at the distal tip of her second toe on the left foot and there is a keratoma on the distal lateral tip of her fourth toe but she has been using an emery board to keep it filed down it is not as bad as it usually is.  Toenails are elongated.  There is mild thickening of several of the lesser toenails.   2. Vascular: Patient has very weakened pulses but still palpable bilateral cap return to the pedal digits is around 4 seconds   3. Neurologic: Patient has intact sensorium   4. Musculoskeletal: Patient has hammertoes of the fourth digit on the right second toe on the left.    ASSESSMENT AND PLAN:   Diagnoses and all orders for this visit:    Hammer toes of both feet    Heloma durum    Onychomycosis    Pain in toes of both feet    Hammer toe of right foot        Plan: Today trimmed down debrided all affected toenails.  Using a sterile 15 blade trim down debrided hyperkeratoses dispensed padding which were silicone toe shields on the second toe of the right fourth toe on the left she will wear them as tolerated follow-up with us again as needed.    The patient indicates understanding of these  issues and agrees to the plan.    John Harmon, CHRISTOPHERM

## 2025-04-09 ENCOUNTER — OFFICE VISIT (OUTPATIENT)
Dept: PODIATRY CLINIC | Facility: CLINIC | Age: 85
End: 2025-04-09

## 2025-04-09 DIAGNOSIS — M15.9 OSTEOARTHRITIS OF MULTIPLE JOINTS, UNSPECIFIED OSTEOARTHRITIS TYPE: ICD-10-CM

## 2025-04-09 DIAGNOSIS — L60.0 ONYCHOCRYPTOSIS: ICD-10-CM

## 2025-04-09 DIAGNOSIS — B35.1 ONYCHOMYCOSIS: Primary | ICD-10-CM

## 2025-04-09 DIAGNOSIS — M79.675 PAIN IN TOES OF BOTH FEET: ICD-10-CM

## 2025-04-09 DIAGNOSIS — M79.674 PAIN IN TOES OF BOTH FEET: ICD-10-CM

## 2025-04-09 PROCEDURE — 99213 OFFICE O/P EST LOW 20 MIN: CPT | Performed by: PODIATRIST

## 2025-04-09 NOTE — PROGRESS NOTES
Dee Moreno is a 84 year old female.   Chief Complaint   Patient presents with    Toenail Care     F/u toenail  and callus care         HPI:   Patient returns to the clinic in because of extensive osteoarthritis of multiple joints including her and she cannot provide any type of self-care she is here for routine nail and callus care stating that she does not think she has corns anymore because she has been using the emery board faithful on the fourth toe of the right foot and second toe of the left.  At today's visit reviewed nurse's history as taken above, allergies medications and medical history as documented below.  All changes duly noted  Allergies: Codeine phosphate, Azithromycin, Bactrim [sulfamethoxazole w/trimethoprim], Clindamycin, Penicillins, and Sulfa antibiotics   Current Outpatient Medications   Medication Sig Dispense Refill    traMADol 50 MG Oral Tab Take 1 tablet (50 mg total) by mouth every 6 (six) hours as needed for Pain. 270 tablet 2    levothyroxine 25 MCG Oral Tab Take 1 tablet (25 mcg total) by mouth before breakfast. 90 tablet 1    AMLODIPINE 5 MG Oral Tab TAKE 1 TABLET(5 MG) BY MOUTH DAILY 90 tablet 3    VYZULTA 0.024 % Ophthalmic Solution Place 1 drop into both eyes nightly. TAKE AT BEDTIME      famoTIDine 20 MG Oral Tab Take 1 tablet (20 mg total) by mouth nightly as needed for Heartburn. 90 tablet 3    lisinopril 40 MG Oral Tab TAKE 1 TABLET(40 MG) BY MOUTH DAILY 90 tablet 3    Meclizine HCl 25 MG Oral Tab Take 1 tablet (25 mg total) by mouth 3 (three) times daily as needed. 30 tablet 1    Denosumab 60 MG/ML Subcutaneous Solution Prefilled Syringe Inject 1 mL (60 mg total) into the skin every 6 (six) months.      Calcium Carb-Cholecalciferol (CALCIUM-VITAMIN D) 500-200 MG-UNIT Oral Tab Take 2 tablets by mouth.      hydrochlorothiazide 25 MG Oral Tab Take daily as needed 90 tablet 3    Estradiol (ESTRACE) 0.1 MG/GM Vaginal Cream Place 1 g vaginally 3 (three) times a week. 42.5 g 11     aspirin 81 MG Oral Chew Tab Chew 1 tablet (81 mg total) by mouth daily.      vitamin E 1000 UNITS Oral Cap Take 1 capsule (1,000 Units total) by mouth 2 (two) times daily.      dorzolamide-timolol 22.3-6.8 MG/ML Ophthalmic Solution 1 drop 2 (two) times daily.      JUICE PLUS FIBRE OR Take 2 tablets by mouth 2 (two) times daily.      OMEGA 3 OR 1 TABLET DAILY      VITAMIN D 1000 UNIT OR TABS Take 2 tablets by mouth 2 (two) times daily.      OSCAL 500/200 D-3 500-200 MG-UNIT OR TABS Take 2 tablets by mouth 2 (two) times daily.      TYLENOL ARTHRITIS PAIN 650 MG OR TBCR Take 2 tablets (1,300 mg total) by mouth 2 (two) times daily.        Past Medical History:    Cataract    Disorder of thyroid    Esophageal reflux    Glaucoma    High blood pressure    High cholesterol    OSTEOARTHRITIS    Osteoarthritis of multiple joints, unspecified osteoarthritis type    OSTEOPOROSIS    PONV (postoperative nausea and vomiting)    ponv; bowel went into spasm per patient, use smaller scope      Past Surgical History:   Procedure Laterality Date    Appendectomy      Cataract      Colonoscopy N/A 6/3/2022    Procedure: COLONOSCOPY with cold snare polypectomy x2;  Surgeon: Rob Portillo MD;  Location:  ENDOSCOPY    Hysterectomy  8/10/16    vaginal hysterectomy and bladder sling with BSO    Mammogram, diagnostic, unilateral, right (cpt=77055)  9/09    [facility]    Other surgical history      multiple surgeries from fractures from falls    Other surgical history      bladder sling      Family History   Problem Relation Age of Onset    Breast Cancer Maternal Aunt 50        age 50's    Breast Cancer Sister 75        age 70's    Other (brain aneurysm) Father       Social History     Socioeconomic History    Marital status: Single   Occupational History    Occupation: Nun; [employer] education   Tobacco Use    Smoking status: Never    Smokeless tobacco: Never   Vaping Use    Vaping status: Never Used   Substance and Sexual Activity     Alcohol use: No     Alcohol/week: 0.0 standard drinks of alcohol    Drug use: No    Sexual activity: Never   Other Topics Concern    Exercise Yes     Comment: walk           REVIEW OF SYSTEMS:   Today reviewed systens as documented below  GENERAL HEALTH: feels well otherwise  SKIN: Refer to exam below  RESPIRATORY: denies shortness of breath with exertion  CARDIOVASCULAR: denies chest pain on exertion  GI: denies abdominal pain and denies heartburn  NEURO: denies headaches    EXAM:   LMP  (LMP Unknown)   GENERAL: well developed, well nourished, in no apparent distress  EXTREMITIES:   1. Integument: The skin on both feet was evaluated is warm and dry.  She has been faithfully using an emery board on her corns.  The corn on the fourth toe is not visualized that she has a little bit of redness skin all over the DIP joint and at the medial DIP joint of the left second toe again there is no corn present because she has been faithful with using the emery board.  Her toenails however remain unchanged they are still incurvated on several toes including the great toes and that can become painful and they are all elongated.  She cannot provide self-care.   2. Vascular: Patient has very weakened pulses but still palpable bilateral cap return to the pedal digits is around 4 seconds   3. Neurologic: Patient has intact sensorium   4. Musculoskeletal: Patient has hammertoes of the fourth digit on the right second toe on the left.    ASSESSMENT AND PLAN:   Diagnoses and all orders for this visit:    Onychomycosis    Onychocryptosis    Pain in toes of both feet    Osteoarthritis of multiple joints, unspecified osteoarthritis type        Plan: Today trimmed down debrided all affected toenails.  Using a sterile 15 blade trim down debrided hyperkeratoses dispensed padding which were silicone toe shields on the second toe of the right fourth toe on the left she will wear them as tolerated follow-up with us again as needed.    The  patient indicates understanding of these issues and agrees to the plan.    John Harmon DPM

## (undated) DEVICE — ENDOSCOPY PACK - LOWER: Brand: MEDLINE INDUSTRIES, INC.

## (undated) DEVICE — 3M™ RED DOT™ MONITORING ELECTRODE WITH FOAM TAPE AND STICKY GEL, 50/BAG, 20/CASE, 72/PLT 2570: Brand: RED DOT™

## (undated) DEVICE — FILTERLINE NASAL ADULT O2/CO2

## (undated) DEVICE — TRAP SPEC REMOVAL ETRAP 15CM

## (undated) DEVICE — SNARE 9MM 230CM 2.4MM EXACTO

## (undated) DEVICE — Device: Brand: DEFENDO AIR/WATER/SUCTION AND BIOPSY VALVE

## (undated) DEVICE — 1200CC GUARDIAN II: Brand: GUARDIAN